# Patient Record
Sex: MALE | Race: ASIAN | NOT HISPANIC OR LATINO | URBAN - METROPOLITAN AREA
[De-identification: names, ages, dates, MRNs, and addresses within clinical notes are randomized per-mention and may not be internally consistent; named-entity substitution may affect disease eponyms.]

---

## 2022-07-22 ENCOUNTER — EMERGENCY (EMERGENCY)
Facility: HOSPITAL | Age: 77
LOS: 1 days | Discharge: HOME | End: 2022-07-22
Attending: EMERGENCY MEDICINE | Admitting: EMERGENCY MEDICINE

## 2022-07-22 VITALS
HEART RATE: 63 BPM | HEIGHT: 66 IN | RESPIRATION RATE: 18 BRPM | OXYGEN SATURATION: 99 % | SYSTOLIC BLOOD PRESSURE: 138 MMHG | DIASTOLIC BLOOD PRESSURE: 63 MMHG | TEMPERATURE: 98 F

## 2022-07-22 VITALS
RESPIRATION RATE: 14 BRPM | DIASTOLIC BLOOD PRESSURE: 76 MMHG | OXYGEN SATURATION: 100 % | SYSTOLIC BLOOD PRESSURE: 166 MMHG | TEMPERATURE: 98 F | HEART RATE: 51 BPM

## 2022-07-22 LAB
ALBUMIN SERPL ELPH-MCNC: 4.2 G/DL — SIGNIFICANT CHANGE UP (ref 3.3–5)
ALP SERPL-CCNC: 30 U/L — LOW (ref 40–120)
ALT FLD-CCNC: 15 U/L — SIGNIFICANT CHANGE UP (ref 4–41)
ANION GAP SERPL CALC-SCNC: 13 MMOL/L — SIGNIFICANT CHANGE UP (ref 7–14)
AST SERPL-CCNC: 32 U/L — SIGNIFICANT CHANGE UP (ref 4–40)
BASE EXCESS BLDV CALC-SCNC: -3.3 MMOL/L — LOW (ref -2–3)
BASOPHILS # BLD AUTO: 0.01 K/UL — SIGNIFICANT CHANGE UP (ref 0–0.2)
BASOPHILS NFR BLD AUTO: 0.2 % — SIGNIFICANT CHANGE UP (ref 0–2)
BILIRUB SERPL-MCNC: 0.5 MG/DL — SIGNIFICANT CHANGE UP (ref 0.2–1.2)
BLOOD GAS VENOUS COMPREHENSIVE RESULT: SIGNIFICANT CHANGE UP
BUN SERPL-MCNC: 20 MG/DL — SIGNIFICANT CHANGE UP (ref 7–23)
CALCIUM SERPL-MCNC: 8.6 MG/DL — SIGNIFICANT CHANGE UP (ref 8.4–10.5)
CHLORIDE BLDV-SCNC: 101 MMOL/L — SIGNIFICANT CHANGE UP (ref 96–108)
CHLORIDE SERPL-SCNC: 97 MMOL/L — LOW (ref 98–107)
CO2 BLDV-SCNC: 23.3 MMOL/L — SIGNIFICANT CHANGE UP (ref 22–26)
CO2 SERPL-SCNC: 20 MMOL/L — LOW (ref 22–31)
CREAT SERPL-MCNC: 1.34 MG/DL — HIGH (ref 0.5–1.3)
EGFR: 55 ML/MIN/1.73M2 — LOW
EOSINOPHIL # BLD AUTO: 0.01 K/UL — SIGNIFICANT CHANGE UP (ref 0–0.5)
EOSINOPHIL NFR BLD AUTO: 0.2 % — SIGNIFICANT CHANGE UP (ref 0–6)
FLUAV AG NPH QL: SIGNIFICANT CHANGE UP
FLUBV AG NPH QL: SIGNIFICANT CHANGE UP
GAS PNL BLDV: 130 MMOL/L — LOW (ref 136–145)
GAS PNL BLDV: SIGNIFICANT CHANGE UP
GLUCOSE BLDV-MCNC: 93 MG/DL — SIGNIFICANT CHANGE UP (ref 70–99)
GLUCOSE SERPL-MCNC: 105 MG/DL — HIGH (ref 70–99)
HCO3 BLDV-SCNC: 22 MMOL/L — SIGNIFICANT CHANGE UP (ref 22–29)
HCT VFR BLD CALC: 34.2 % — LOW (ref 39–50)
HCT VFR BLDA CALC: 35 % — LOW (ref 39–51)
HGB BLD CALC-MCNC: 11.5 G/DL — LOW (ref 13–17)
HGB BLD-MCNC: 11.6 G/DL — LOW (ref 13–17)
IANC: 3.96 K/UL — SIGNIFICANT CHANGE UP (ref 1.8–7.4)
IMM GRANULOCYTES NFR BLD AUTO: 0.2 % — SIGNIFICANT CHANGE UP (ref 0–1.5)
LACTATE BLDV-MCNC: 1 MMOL/L — SIGNIFICANT CHANGE UP (ref 0.5–2)
LYMPHOCYTES # BLD AUTO: 1.37 K/UL — SIGNIFICANT CHANGE UP (ref 1–3.3)
LYMPHOCYTES # BLD AUTO: 21.5 % — SIGNIFICANT CHANGE UP (ref 13–44)
MCHC RBC-ENTMCNC: 28.2 PG — SIGNIFICANT CHANGE UP (ref 27–34)
MCHC RBC-ENTMCNC: 33.9 GM/DL — SIGNIFICANT CHANGE UP (ref 32–36)
MCV RBC AUTO: 83.2 FL — SIGNIFICANT CHANGE UP (ref 80–100)
MONOCYTES # BLD AUTO: 1.02 K/UL — HIGH (ref 0–0.9)
MONOCYTES NFR BLD AUTO: 16 % — HIGH (ref 2–14)
NEUTROPHILS # BLD AUTO: 3.96 K/UL — SIGNIFICANT CHANGE UP (ref 1.8–7.4)
NEUTROPHILS NFR BLD AUTO: 61.9 % — SIGNIFICANT CHANGE UP (ref 43–77)
NRBC # BLD: 0 /100 WBCS — SIGNIFICANT CHANGE UP
NRBC # FLD: 0 K/UL — SIGNIFICANT CHANGE UP
NT-PROBNP SERPL-SCNC: 636 PG/ML — HIGH
PCO2 BLDV: 40 MMHG — LOW (ref 42–55)
PH BLDV: 7.35 — SIGNIFICANT CHANGE UP (ref 7.32–7.43)
PLATELET # BLD AUTO: 115 K/UL — LOW (ref 150–400)
PO2 BLDV: 39 MMHG — SIGNIFICANT CHANGE UP
POTASSIUM BLDV-SCNC: 4.1 MMOL/L — SIGNIFICANT CHANGE UP (ref 3.5–5.1)
POTASSIUM SERPL-MCNC: 4.3 MMOL/L — SIGNIFICANT CHANGE UP (ref 3.5–5.3)
POTASSIUM SERPL-SCNC: 4.3 MMOL/L — SIGNIFICANT CHANGE UP (ref 3.5–5.3)
PROT SERPL-MCNC: 7 G/DL — SIGNIFICANT CHANGE UP (ref 6–8.3)
RBC # BLD: 4.11 M/UL — LOW (ref 4.2–5.8)
RBC # FLD: 13.5 % — SIGNIFICANT CHANGE UP (ref 10.3–14.5)
RSV RNA NPH QL NAA+NON-PROBE: SIGNIFICANT CHANGE UP
SAO2 % BLDV: 61 % — SIGNIFICANT CHANGE UP
SARS-COV-2 RNA SPEC QL NAA+PROBE: DETECTED
SODIUM SERPL-SCNC: 130 MMOL/L — LOW (ref 135–145)
TROPONIN T, HIGH SENSITIVITY RESULT: 16 NG/L — SIGNIFICANT CHANGE UP
TROPONIN T, HIGH SENSITIVITY RESULT: 21 NG/L — SIGNIFICANT CHANGE UP
WBC # BLD: 6.38 K/UL — SIGNIFICANT CHANGE UP (ref 3.8–10.5)
WBC # FLD AUTO: 6.38 K/UL — SIGNIFICANT CHANGE UP (ref 3.8–10.5)

## 2022-07-22 PROCEDURE — 71046 X-RAY EXAM CHEST 2 VIEWS: CPT | Mod: 26

## 2022-07-22 PROCEDURE — 99285 EMERGENCY DEPT VISIT HI MDM: CPT | Mod: 25

## 2022-07-22 PROCEDURE — 93010 ELECTROCARDIOGRAM REPORT: CPT

## 2022-07-22 RX ORDER — IBUPROFEN 200 MG
400 TABLET ORAL ONCE
Refills: 0 | Status: COMPLETED | OUTPATIENT
Start: 2022-07-22 | End: 2022-07-22

## 2022-07-22 RX ORDER — SODIUM CHLORIDE 9 MG/ML
1000 INJECTION INTRAMUSCULAR; INTRAVENOUS; SUBCUTANEOUS ONCE
Refills: 0 | Status: COMPLETED | OUTPATIENT
Start: 2022-07-22 | End: 2022-07-22

## 2022-07-22 RX ADMIN — SODIUM CHLORIDE 1000 MILLILITER(S): 9 INJECTION INTRAMUSCULAR; INTRAVENOUS; SUBCUTANEOUS at 11:45

## 2022-07-22 RX ADMIN — Medication 400 MILLIGRAM(S): at 13:28

## 2022-07-22 NOTE — ED PROVIDER NOTE - CLINICAL SUMMARY MEDICAL DECISION MAKING FREE TEXT BOX
77y male pt PMHx HTN, HLD, CAD w stents, DM who presents to ED for sob, cough, sore throat, and chest pain w exertion. of note new onset dizziness. On exam found unremarkable. Will draw labs, swab, cxr, ekg and reassess

## 2022-07-22 NOTE — ED ADULT NURSE NOTE - OBJECTIVE STATEMENT
Pt arriving to room 23 A&OX4 ambulatory c/o lightheadedness x 1 week. Pt seen by PCP on Wednesday. Endorsing sore throat. Denies CP, SOB, abdominal pain, N/V/D, sick contacts. Respirations even and unlabored. Sinus naya on CM. 20g IV placed in RAC. Labs drawn and sent. Fluids running as ordered. Awaiting CT.

## 2022-07-22 NOTE — ED PROVIDER NOTE - OBJECTIVE STATEMENT
77 male pt PMHx CAD stents, HTN, HLD, DM who presents to ED for GOMEZ, cough, sore throat and chest pain w exertion since couple of days ago. Of note endorsing new onset dizziness/lightheadedness. Covid vaccinated. Denies fevers, chills, abd pain, n/v/d, urine symptoms 77 male pt PMHx CAD stents, HTN, HLD, DM who presents to ED for GOMEZ, cough, sore throat and chest pain w cough X 1 wk. Of note endorsing new onset dizziness/lightheadedness. Covid vaccinated. Denies fevers, chills, abd pain, n/v/d, urine symptoms

## 2022-07-22 NOTE — ED PROVIDER NOTE - PHYSICAL EXAMINATION
GENERAL: Awake, alert, NAD  HEENT: NC/AT, pharyngitis  LUNGS: CTAB, no wheezes or crackles   CARDIAC: RRR, no m/r/g  ABDOMEN: Soft,  non tender, non distended, no rebound, no guarding  EXT: No edema, no calf tenderness, 2+ PT pulses bilaterally, no deformities.  NEURO: A&Ox3. Moving all extremities.  SKIN: Warm and dry. No rash.

## 2022-07-22 NOTE — ED ADULT NURSE REASSESSMENT NOTE - NS ED NURSE REASSESS COMMENT FT1
Pt resting comfortably in stretcher. Pt Covid+. States grandchildren that he lives with tested positive last week. Respirations even and unlabored. Denies CP. Medicated as per EMAR. Pt to be discharged home.

## 2022-07-22 NOTE — ED PROVIDER NOTE - NSICDXPASTMEDICALHX_GEN_ALL_CORE_FT
PAST MEDICAL HISTORY:  CAD (coronary artery disease)     DM (diabetes mellitus)     HTN (hypertension)     Stented coronary artery

## 2022-07-22 NOTE — ED PROVIDER NOTE - NSFOLLOWUPINSTRUCTIONS_ED_ALL_ED_FT
You were found to have COVID in the ED. May take Tylenol and Ibuprofen for fevers or pain. Important to follow up with your cardiologist and primary care provider for further management on your exertional chest pain.     Please return to ED if you have any worsening shortness of breath, or new onset vomiting, lethargy, or any other concerns.     Important to quarantine for 5-7 days since symptom onset.     Continue taking your at home medications as prescribed.

## 2022-07-22 NOTE — ED PROVIDER NOTE - NS ED ROS FT
CONST: no fevers, no chills. +dizziness  EYES: no pain, no vision changes  ENT: +sore throat  CV: +chest pain  RESP: +SOB, cough  ABD: no abdominal pain, no nausea, no vomiting, no diarrhea  : no dysuria, no flank pain, no hematuria  MSK: no back pain, no extremity pain  NEURO: no headache or additional neurologic complaints  SKIN:  no rash

## 2022-07-22 NOTE — ED PROVIDER NOTE - ATTENDING CONTRIBUTION TO CARE
Pt was seen and evaluated by me. Pt is a 76 y/o male with PMHx of CAD s/p stents, HTN, HLD, DM type 2 who presented to the ED for cough, sore throat, and chest pain with cough X 1 wk. Pt states over the past wk having cough, sore throat, and chest pain X 1 wk. Pt also notes having some dizziness and lightheadedness, Pt denies any fever, chills, SOB, or chest pain without cough. Pt denies any difficulty swallowing.   VITALS: Vitals have been reviewed.  GEN APPEARANCE: WDWN, alert and cooperative, non-toxic appearing and in NAD  HEAD: Atraumatic, normocephalic.   EYES: PERRL, EOMI.   EARS: Gross hearing intact.   NOSE: No nasal discharge.   THROAT: MMM. Oral cavity and pharynx normal. Uvula midline. No swelling. No exudate.    NECK: Supple, no lymphadenopathy  CV: RRR, S1S2, no c/r/m/g. No cyanosis or pallor. Extremities warm, well perfused. Cap refill <2 seconds. No bruits.   LUNGS: CTAB. No wheezing. No rales. No rhonchi. No diminished breath sounds.   ABDOMEN: Soft, NTND. No guarding or rebound.   MSK/EXT: Spine appears normal, no spine point tenderness. No CVA ttp. Normal muscular development. PELVIS: Stable. No obvious joint or bony deformity, no peripheral edema.   NEURO: Alert, follows commands. Speech normal. Sensation and motor normal x4 extremities.   SKIN: Normal color for race, warm, dry and intact. No evidence of rash.  PSYCH: Normal mood and affect.   76 y/o male with PMHx of CAD s/p stents, HTN, HLD, DM type 2 who presented to the ED for cough, sore throat, and chest pain with cough X 1 wk.   Concern for COVID/URI/ACS  Labs, EKG, CXR, IVF, Analgesia

## 2022-07-22 NOTE — ED PROVIDER NOTE - PATIENT PORTAL LINK FT
You can access the FollowMyHealth Patient Portal offered by [facility] by registering at the following website: [email or web address]. By joining [facility]’s FollowMyHealth portal, you will also be able to view your health information using other applications (apps) compatible with our system.

## 2023-09-06 ENCOUNTER — INPATIENT (INPATIENT)
Facility: HOSPITAL | Age: 78
LOS: 1 days | Discharge: HOME | End: 2023-09-08
Attending: INTERNAL MEDICINE | Admitting: INTERNAL MEDICINE
Payer: MEDICARE

## 2023-09-06 VITALS
DIASTOLIC BLOOD PRESSURE: 83 MMHG | OXYGEN SATURATION: 100 % | TEMPERATURE: 98 F | HEART RATE: 64 BPM | RESPIRATION RATE: 18 BRPM | SYSTOLIC BLOOD PRESSURE: 172 MMHG

## 2023-09-06 DIAGNOSIS — D64.9 ANEMIA, UNSPECIFIED: ICD-10-CM

## 2023-09-06 DIAGNOSIS — Z29.9 ENCOUNTER FOR PROPHYLACTIC MEASURES, UNSPECIFIED: ICD-10-CM

## 2023-09-06 DIAGNOSIS — R07.9 CHEST PAIN, UNSPECIFIED: ICD-10-CM

## 2023-09-06 DIAGNOSIS — R00.1 BRADYCARDIA, UNSPECIFIED: ICD-10-CM

## 2023-09-06 DIAGNOSIS — I10 ESSENTIAL (PRIMARY) HYPERTENSION: ICD-10-CM

## 2023-09-06 DIAGNOSIS — I25.10 ATHEROSCLEROTIC HEART DISEASE OF NATIVE CORONARY ARTERY WITHOUT ANGINA PECTORIS: ICD-10-CM

## 2023-09-06 DIAGNOSIS — K92.1 MELENA: ICD-10-CM

## 2023-09-06 LAB
ALBUMIN SERPL ELPH-MCNC: 4.3 G/DL — SIGNIFICANT CHANGE UP (ref 3.3–5)
ALP SERPL-CCNC: 38 U/L — LOW (ref 40–120)
ALT FLD-CCNC: 18 U/L — SIGNIFICANT CHANGE UP (ref 4–41)
ANION GAP SERPL CALC-SCNC: 13 MMOL/L — SIGNIFICANT CHANGE UP (ref 7–14)
AST SERPL-CCNC: 26 U/L — SIGNIFICANT CHANGE UP (ref 4–40)
BASOPHILS # BLD AUTO: 0.02 K/UL — SIGNIFICANT CHANGE UP (ref 0–0.2)
BASOPHILS NFR BLD AUTO: 0.3 % — SIGNIFICANT CHANGE UP (ref 0–2)
BILIRUB SERPL-MCNC: 0.4 MG/DL — SIGNIFICANT CHANGE UP (ref 0.2–1.2)
BUN SERPL-MCNC: 21 MG/DL — SIGNIFICANT CHANGE UP (ref 7–23)
CALCIUM SERPL-MCNC: 9.4 MG/DL — SIGNIFICANT CHANGE UP (ref 8.4–10.5)
CHLORIDE SERPL-SCNC: 101 MMOL/L — SIGNIFICANT CHANGE UP (ref 98–107)
CO2 SERPL-SCNC: 23 MMOL/L — SIGNIFICANT CHANGE UP (ref 22–31)
CREAT SERPL-MCNC: 1.23 MG/DL — SIGNIFICANT CHANGE UP (ref 0.5–1.3)
EGFR: 60 ML/MIN/1.73M2 — SIGNIFICANT CHANGE UP
EOSINOPHIL # BLD AUTO: 0.13 K/UL — SIGNIFICANT CHANGE UP (ref 0–0.5)
EOSINOPHIL NFR BLD AUTO: 1.8 % — SIGNIFICANT CHANGE UP (ref 0–6)
FLUAV AG NPH QL: SIGNIFICANT CHANGE UP
FLUBV AG NPH QL: SIGNIFICANT CHANGE UP
GLUCOSE BLDC GLUCOMTR-MCNC: 215 MG/DL — HIGH (ref 70–99)
GLUCOSE BLDC GLUCOMTR-MCNC: 96 MG/DL — SIGNIFICANT CHANGE UP (ref 70–99)
GLUCOSE SERPL-MCNC: 95 MG/DL — SIGNIFICANT CHANGE UP (ref 70–99)
HCT VFR BLD CALC: 34.8 % — LOW (ref 39–50)
HGB BLD-MCNC: 11.8 G/DL — LOW (ref 13–17)
IANC: 3.85 K/UL — SIGNIFICANT CHANGE UP (ref 1.8–7.4)
IMM GRANULOCYTES NFR BLD AUTO: 0.1 % — SIGNIFICANT CHANGE UP (ref 0–0.9)
LYMPHOCYTES # BLD AUTO: 2.68 K/UL — SIGNIFICANT CHANGE UP (ref 1–3.3)
LYMPHOCYTES # BLD AUTO: 36.3 % — SIGNIFICANT CHANGE UP (ref 13–44)
MCHC RBC-ENTMCNC: 27.4 PG — SIGNIFICANT CHANGE UP (ref 27–34)
MCHC RBC-ENTMCNC: 33.9 GM/DL — SIGNIFICANT CHANGE UP (ref 32–36)
MCV RBC AUTO: 80.9 FL — SIGNIFICANT CHANGE UP (ref 80–100)
MONOCYTES # BLD AUTO: 0.7 K/UL — SIGNIFICANT CHANGE UP (ref 0–0.9)
MONOCYTES NFR BLD AUTO: 9.5 % — SIGNIFICANT CHANGE UP (ref 2–14)
NEUTROPHILS # BLD AUTO: 3.85 K/UL — SIGNIFICANT CHANGE UP (ref 1.8–7.4)
NEUTROPHILS NFR BLD AUTO: 52 % — SIGNIFICANT CHANGE UP (ref 43–77)
NRBC # BLD: 0 /100 WBCS — SIGNIFICANT CHANGE UP (ref 0–0)
NRBC # FLD: 0 K/UL — SIGNIFICANT CHANGE UP (ref 0–0)
NT-PROBNP SERPL-SCNC: 265 PG/ML — SIGNIFICANT CHANGE UP
PLATELET # BLD AUTO: 181 K/UL — SIGNIFICANT CHANGE UP (ref 150–400)
POTASSIUM SERPL-MCNC: 4.4 MMOL/L — SIGNIFICANT CHANGE UP (ref 3.5–5.3)
POTASSIUM SERPL-SCNC: 4.4 MMOL/L — SIGNIFICANT CHANGE UP (ref 3.5–5.3)
PROT SERPL-MCNC: 7.3 G/DL — SIGNIFICANT CHANGE UP (ref 6–8.3)
RBC # BLD: 4.3 M/UL — SIGNIFICANT CHANGE UP (ref 4.2–5.8)
RBC # FLD: 14.6 % — HIGH (ref 10.3–14.5)
RSV RNA NPH QL NAA+NON-PROBE: SIGNIFICANT CHANGE UP
SARS-COV-2 RNA SPEC QL NAA+PROBE: SIGNIFICANT CHANGE UP
SODIUM SERPL-SCNC: 137 MMOL/L — SIGNIFICANT CHANGE UP (ref 135–145)
TROPONIN T, HIGH SENSITIVITY RESULT: 15 NG/L — SIGNIFICANT CHANGE UP
TROPONIN T, HIGH SENSITIVITY RESULT: 19 NG/L — SIGNIFICANT CHANGE UP
WBC # BLD: 7.39 K/UL — SIGNIFICANT CHANGE UP (ref 3.8–10.5)
WBC # FLD AUTO: 7.39 K/UL — SIGNIFICANT CHANGE UP (ref 3.8–10.5)

## 2023-09-06 PROCEDURE — 99285 EMERGENCY DEPT VISIT HI MDM: CPT | Mod: 25

## 2023-09-06 PROCEDURE — 99223 1ST HOSP IP/OBS HIGH 75: CPT

## 2023-09-06 PROCEDURE — 71046 X-RAY EXAM CHEST 2 VIEWS: CPT | Mod: 26

## 2023-09-06 RX ORDER — DEXTROSE 50 % IN WATER 50 %
15 SYRINGE (ML) INTRAVENOUS ONCE
Refills: 0 | Status: DISCONTINUED | OUTPATIENT
Start: 2023-09-06 | End: 2023-09-06

## 2023-09-06 RX ORDER — LOSARTAN POTASSIUM 100 MG/1
1 TABLET, FILM COATED ORAL
Refills: 0 | DISCHARGE

## 2023-09-06 RX ORDER — DEXTROSE 50 % IN WATER 50 %
25 SYRINGE (ML) INTRAVENOUS ONCE
Refills: 0 | Status: DISCONTINUED | OUTPATIENT
Start: 2023-09-06 | End: 2023-09-06

## 2023-09-06 RX ORDER — ACETAMINOPHEN 500 MG
650 TABLET ORAL EVERY 6 HOURS
Refills: 0 | Status: DISCONTINUED | OUTPATIENT
Start: 2023-09-06 | End: 2023-09-08

## 2023-09-06 RX ORDER — FUROSEMIDE 40 MG
1 TABLET ORAL
Refills: 0 | DISCHARGE

## 2023-09-06 RX ORDER — INSULIN LISPRO 100/ML
VIAL (ML) SUBCUTANEOUS
Refills: 0 | Status: DISCONTINUED | OUTPATIENT
Start: 2023-09-06 | End: 2023-09-06

## 2023-09-06 RX ORDER — METOPROLOL TARTRATE 50 MG
25 TABLET ORAL DAILY
Refills: 0 | Status: DISCONTINUED | OUTPATIENT
Start: 2023-09-06 | End: 2023-09-08

## 2023-09-06 RX ORDER — GLUCAGON INJECTION, SOLUTION 0.5 MG/.1ML
1 INJECTION, SOLUTION SUBCUTANEOUS ONCE
Refills: 0 | Status: DISCONTINUED | OUTPATIENT
Start: 2023-09-06 | End: 2023-09-06

## 2023-09-06 RX ORDER — SODIUM CHLORIDE 9 MG/ML
1000 INJECTION, SOLUTION INTRAVENOUS
Refills: 0 | Status: DISCONTINUED | OUTPATIENT
Start: 2023-09-06 | End: 2023-09-06

## 2023-09-06 RX ORDER — ROSUVASTATIN CALCIUM 5 MG/1
1 TABLET ORAL
Refills: 0 | DISCHARGE

## 2023-09-06 RX ORDER — LEVOTHYROXINE SODIUM 125 MCG
1 TABLET ORAL
Refills: 0 | DISCHARGE

## 2023-09-06 RX ORDER — INSULIN LISPRO 100/ML
VIAL (ML) SUBCUTANEOUS AT BEDTIME
Refills: 0 | Status: DISCONTINUED | OUTPATIENT
Start: 2023-09-06 | End: 2023-09-06

## 2023-09-06 RX ORDER — LOSARTAN POTASSIUM 100 MG/1
50 TABLET, FILM COATED ORAL DAILY
Refills: 0 | Status: DISCONTINUED | OUTPATIENT
Start: 2023-09-06 | End: 2023-09-08

## 2023-09-06 RX ORDER — ASPIRIN/CALCIUM CARB/MAGNESIUM 324 MG
324 TABLET ORAL ONCE
Refills: 0 | Status: COMPLETED | OUTPATIENT
Start: 2023-09-06 | End: 2023-09-06

## 2023-09-06 RX ORDER — ATORVASTATIN CALCIUM 80 MG/1
80 TABLET, FILM COATED ORAL AT BEDTIME
Refills: 0 | Status: DISCONTINUED | OUTPATIENT
Start: 2023-09-06 | End: 2023-09-08

## 2023-09-06 RX ORDER — METOPROLOL TARTRATE 50 MG
1 TABLET ORAL
Refills: 0 | DISCHARGE

## 2023-09-06 RX ORDER — LANOLIN ALCOHOL/MO/W.PET/CERES
3 CREAM (GRAM) TOPICAL AT BEDTIME
Refills: 0 | Status: DISCONTINUED | OUTPATIENT
Start: 2023-09-06 | End: 2023-09-08

## 2023-09-06 RX ORDER — DEXTROSE 50 % IN WATER 50 %
12.5 SYRINGE (ML) INTRAVENOUS ONCE
Refills: 0 | Status: DISCONTINUED | OUTPATIENT
Start: 2023-09-06 | End: 2023-09-06

## 2023-09-06 RX ORDER — ONDANSETRON 8 MG/1
4 TABLET, FILM COATED ORAL EVERY 8 HOURS
Refills: 0 | Status: DISCONTINUED | OUTPATIENT
Start: 2023-09-06 | End: 2023-09-08

## 2023-09-06 RX ORDER — LEVOTHYROXINE SODIUM 125 MCG
88 TABLET ORAL DAILY
Refills: 0 | Status: DISCONTINUED | OUTPATIENT
Start: 2023-09-06 | End: 2023-09-08

## 2023-09-06 RX ADMIN — Medication 324 MILLIGRAM(S): at 09:20

## 2023-09-06 RX ADMIN — ATORVASTATIN CALCIUM 80 MILLIGRAM(S): 80 TABLET, FILM COATED ORAL at 22:27

## 2023-09-06 NOTE — CONSULT NOTE ADULT - SUBJECTIVE AND OBJECTIVE BOX
date of consult 9/6/23    HISTORY OF PRESENT ILLNESS: HPI:    78-year-old male with past medical history of coronary artery disease s/p PCI, and hypertension presenting with chest pain associated with shortness of breath that has been present for over a year however worsened over the past 2 days. Reports pain only with exertion the last 2 days.  No recent work up.  Has followed intermittently with [facility] clinic.  LAst cath here 2015 with RCA stent per report.      No LE edema, no orthopnea, no palps.  Reports dizziness when walking 2 days ago, no LOC.  Hx hypothyroid, no longer on medications.  DEnies DM or HTN.    PAST MEDICAL & SURGICAL HISTORY:  CAD (coronary artery disease)      HTN (hypertension)      Stented coronary artery      No significant past surgical history      MEDICATIONS  (STANDING):  dextrose 5%. 1000 milliLiter(s) (100 mL/Hr) IV Continuous <Continuous>  dextrose 5%. 1000 milliLiter(s) (50 mL/Hr) IV Continuous <Continuous>  dextrose 50% Injectable 25 Gram(s) IV Push once  dextrose 50% Injectable 12.5 Gram(s) IV Push once  dextrose 50% Injectable 25 Gram(s) IV Push once  glucagon  Injectable 1 milliGRAM(s) IntraMuscular once  insulin lispro (ADMELOG) corrective regimen sliding scale   SubCutaneous three times a day before meals  insulin lispro (ADMELOG) corrective regimen sliding scale   SubCutaneous at bedtime      Allergies  PC Pen VK (Hives)      FAMILY HISTORY:  No pertinent family history in first degree relatives  Noncontributory for premature coronary disease or sudden cardiac death    SOCIAL HISTORY:    [x ] Non-smoker  [ ] Smoker  [ ] Alcohol    FLU VACCINE THIS YEAR STARTS IN AUGUST:  [ ] Yes    [ ] No    IF OVER 65 HAVE YOU EVER HAD A PNA VACCINE:  [ ] Yes    [ ] No       [ ] N/A      REVIEW OF SYSTEMS:  [x ]chest pain  [  ]shortness of breath  [  ]palpitations  [  ]syncope  [ ]near syncope [ ]upper extremity weakness   [ ] lower extremity weakness  [  ]diplopia  [  ]altered mental status   [  ]fevers  [ ]chills [ ]nausea  [ ]vomiting  [  ]dysphagia    [ ]abdominal pain  [ ]melena  [ ]BRBPR    [  ]epistaxis  [  ]rash    [ ]lower extremity edema        [x ] All others negative	  [ ] Unable to obtain      LABS:	 	    CARDIAC MARKERS:  TROP T 19, 15                            11.8   7.39  )-----------( 181      ( 06 Sep 2023 08:34 )             34.8     137  |  101  |  21  ----------------------------<  95  4.4   |  23  |  1.23    Ca    9.4      06 Sep 2023 08:34    TPro  7.3  /  Alb  4.3  /  TBili  0.4  /  DBili  x   /  AST  26  /  ALT  18  /  AlkPhos  38<L>  09-06    Creatinine Trend: 1.23<--    PHYSICAL EXAM:  T(C): 36.6 (09-06-23 @ 16:07), Max: 36.8 (09-06-23 @ 07:52)  HR: 51 (09-06-23 @ 16:07) (47 - 64)  BP: 175/78 (09-06-23 @ 16:07) (151/74 - 175/78)  RR: 15 (09-06-23 @ 16:07) (15 - 18)  SpO2: 99% (09-06-23 @ 16:07) (99% - 100%)    Gen: Appears well in NAD  HEENT:  (-)icterus (-)pallor  CV: N S1 S2 1/6 CHRISTIE (+)2 Pulses B/l  Resp:  Clear to ausculatation B/L, normal effort  GI: (+) BS Soft, NT, ND  Lymph:  (-)Edema, (-)obvious lymphadenopathy  Skin: Warm to touch, Normal turgor  Psych: Appropriate mood and affect	      ECG:  NSR	    < from: Xray Chest 2 Views PA/Lat (09.06.23 @ 09:02) >  IMPRESSION: Clear lungs with normal heart size and no evidence of CHF.    < end of copied text >      ASSESSMENT/PLAN: 	  78-year-old male with past medical history of coronary artery disease s/p PCI, and hypertension presenting with chest pain associated with shortness of breath that has been present for over a year however worsened over the past 2 days. Reports pain only with exertion the last 2 days.  No recent work up.  Has followed intermittently with [facility] clinic.  LAst cath here 2015 with RCA stent per report.    CHEST PAIN  --admit to tele  --ACS ruled out  --not in clinical CHF  --check TTE and NST  --check HgA1C and TSH    further reccs pending above                 date of consult 9/6/23    HISTORY OF PRESENT ILLNESS: HPI:    78-year-old male with past medical history of coronary artery disease s/p PCI, and hypertension presenting with chest pain associated with shortness of breath that has been present for over a year however worsened over the past 2 days. Reports pain only with exertion the last 2 days.  No recent work up.  Has followed intermittently with [facility] clinic.  LAst cath here 2015 with RCA stent per report.      No LE edema, no orthopnea, no palps.  Reports dizziness when walking 2 days ago, no LOC.   DEnies DM or HTN.    PAST MEDICAL & SURGICAL HISTORY:  CAD (coronary artery disease)      HTN (hypertension)      Stented coronary artery      No significant past surgical history      MEDICATIONS  (STANDING):  dextrose 5%. 1000 milliLiter(s) (100 mL/Hr) IV Continuous <Continuous>  dextrose 5%. 1000 milliLiter(s) (50 mL/Hr) IV Continuous <Continuous>  dextrose 50% Injectable 25 Gram(s) IV Push once  dextrose 50% Injectable 12.5 Gram(s) IV Push once  dextrose 50% Injectable 25 Gram(s) IV Push once  glucagon  Injectable 1 milliGRAM(s) IntraMuscular once  insulin lispro (ADMELOG) corrective regimen sliding scale   SubCutaneous three times a day before meals  insulin lispro (ADMELOG) corrective regimen sliding scale   SubCutaneous at bedtime      Allergies  PC Pen VK (Hives)      FAMILY HISTORY:  No pertinent family history in first degree relatives  Noncontributory for premature coronary disease or sudden cardiac death    SOCIAL HISTORY:    [x ] Non-smoker  [ ] Smoker  [ ] Alcohol    FLU VACCINE THIS YEAR STARTS IN AUGUST:  [ ] Yes    [ ] No    IF OVER 65 HAVE YOU EVER HAD A PNA VACCINE:  [ ] Yes    [ ] No       [ ] N/A      REVIEW OF SYSTEMS:  [x ]chest pain  [  ]shortness of breath  [  ]palpitations  [  ]syncope  [ ]near syncope [ ]upper extremity weakness   [ ] lower extremity weakness  [  ]diplopia  [  ]altered mental status   [  ]fevers  [ ]chills [ ]nausea  [ ]vomiting  [  ]dysphagia    [ ]abdominal pain  [ ]melena  [ ]BRBPR    [  ]epistaxis  [  ]rash    [ ]lower extremity edema        [x ] All others negative	  [ ] Unable to obtain      LABS:	 	    CARDIAC MARKERS:  TROP T 19, 15                            11.8   7.39  )-----------( 181      ( 06 Sep 2023 08:34 )             34.8     137  |  101  |  21  ----------------------------<  95  4.4   |  23  |  1.23    Ca    9.4      06 Sep 2023 08:34    TPro  7.3  /  Alb  4.3  /  TBili  0.4  /  DBili  x   /  AST  26  /  ALT  18  /  AlkPhos  38<L>  09-06    Creatinine Trend: 1.23<--    PHYSICAL EXAM:  T(C): 36.6 (09-06-23 @ 16:07), Max: 36.8 (09-06-23 @ 07:52)  HR: 51 (09-06-23 @ 16:07) (47 - 64)  BP: 175/78 (09-06-23 @ 16:07) (151/74 - 175/78)  RR: 15 (09-06-23 @ 16:07) (15 - 18)  SpO2: 99% (09-06-23 @ 16:07) (99% - 100%)    Gen: Appears well in NAD  HEENT:  (-)icterus (-)pallor  CV: N S1 S2 1/6 CHRISTIE (+)2 Pulses B/l  Resp:  Clear to ausculatation B/L, normal effort  GI: (+) BS Soft, NT, ND  Lymph:  (-)Edema, (-)obvious lymphadenopathy  Skin: Warm to touch, Normal turgor  Psych: Appropriate mood and affect	      ECG:  NSR	    < from: Xray Chest 2 Views PA/Lat (09.06.23 @ 09:02) >  IMPRESSION: Clear lungs with normal heart size and no evidence of CHF.    < end of copied text >      ASSESSMENT/PLAN: 	  78-year-old male with past medical history of coronary artery disease s/p PCI, and hypertension presenting with chest pain associated with shortness of breath that has been present for over a year however worsened over the past 2 days. Reports pain only with exertion the last 2 days.  No recent work up.  Has followed intermittently with [facility] clinic.  LAst cath here 2015 with RCA stent per report.    CHEST PAIN  --admit to tele  --ACS ruled out  --not in clinical CHF  --check TTE and NST  --check HgA1C and TSH    further reccs pending above

## 2023-09-06 NOTE — PHARMACOTHERAPY INTERVENTION NOTE - COMMENTS
Medication list updated in Outpatient Medication Record (OMR). Source: [facility]

## 2023-09-06 NOTE — ED PROVIDER NOTE - OBJECTIVE STATEMENT
78-year-old male past medical history of coronary artery disease status post stenting, diabetes, hypertension presenting with right-sided atraumatic nonradiating chest pain associated with shortness of breath that has been present for over a year however worsened over the past 2 days.      Patient also endorses feeling lightheadedness for the past 2 days but has had no syncopal episodes.  Chest pain and shortness of breath is worse with exertion.  Wife states 2 days ago patient was sitting on a chair when he slid down, did not hit his head or passed out however wife is concerned–patient states that chair was slippery and just slid down.  Last stress and echo testing was over 2 years ago. Dr. Monika Marshall ([place]) cardiology; has a new cardiologist but unsure of name    Denies syncope, presyncope, LOC, head injury or trauma, leg swelling, change in vision, palpitations, recent travel or prolonged immobilization, nausea, vomiting, diarrhea, abdominal pain, diaphoresis, neck/jaw/arm pain, back pain, cough, congestion, fevers, chills, sore throat, bowel or urine incontinence, hematuria, dysuria.

## 2023-09-06 NOTE — H&P ADULT - PROBLEM SELECTOR PLAN 1
Patient with chest pain that is exertional and associated with SOB   -Cardiology consulted.   -EKG showed bradycardia but no other acute changes. Trops are stable   -F/u with TTE and NST   -C/w telemonitoring

## 2023-09-06 NOTE — H&P ADULT - PROBLEM SELECTOR PLAN 3
Patient with chest pain with exertion   -EKG shows no acute changes. Trops are stable   -Cardiology recs appreciated   -F/u with TTE and NST

## 2023-09-06 NOTE — ED ADULT TRIAGE NOTE - CHIEF COMPLAINT QUOTE
Pt c/o "chest pain and sob worse on exertion x1 year". reports hasn't followed up with any doctors. Denies any leg swelling, n/v, fever. Hx DM, HTN, CAD x5 stents. Well appearing.

## 2023-09-06 NOTE — ED ADULT NURSE REASSESSMENT NOTE - NS ED NURSE REASSESS COMMENT FT1
Break Coverage RN: Pt A&Ox4, respirations equal and unlabored. Pt resting in stretcher at this time, offers no complaints. Pt admitted to telemetry, sinus naya on cardiac monitor, pending inpatient bed assignment. No acute distress noted. Safety maintained, bed in lowest position, side rails raised, call bell in reach.

## 2023-09-06 NOTE — PATIENT PROFILE ADULT - FALL HARM RISK - HARM RISK INTERVENTIONS

## 2023-09-06 NOTE — H&P ADULT - PROBLEM SELECTOR PLAN 2
Patient reports to have diarrhea and hematochezia   -Deferred rectal exam, f/u occult blood feces   -DDx includes hemorrhoids, infectious colitis, AVM, diverticular bleed, malignancy   -F/u with GI PCR, stool culture   -Trend CBC   -Consider GI consult, if hgb is downtrending  -Hold ASA and plavix for now

## 2023-09-06 NOTE — ED ADULT NURSE REASSESSMENT NOTE - NS ED NURSE REASSESS COMMENT FT1
PTs daughter stated PT had a "few droplets of blood in the toilet after the PT had a BM. ACP made aware. will continue to observe.

## 2023-09-06 NOTE — ED ADULT NURSE NOTE - OBJECTIVE STATEMENT
A&Ox3. ambulatory. c/o non radiating right side chest pain on exertion and intermittent dizziness for two days. wife at bedside states PT "slid to floor off of the chair while eating a meal" two days ago. NAD. pt denies SOB, chest pain, dizziness, weakness, urinary symptoms, HA, n/v/d, fevers, chills. respirations are even and un labored. ABD is soft and non tender. skin intact. call bell at bedside. 20g placed to LAC. labs drawn and sent.

## 2023-09-06 NOTE — ED PROVIDER NOTE - CLINICAL SUMMARY MEDICAL DECISION MAKING FREE TEXT BOX
78-year-old male past medical history of coronary artery disease status post stenting, diabetes, hypertension presenting with right-sided atraumatic nonradiating chest pain associated with shortness of breath that has been present for over a year however worsened over the past 2 days.      No evidence of volume overload or shock on exam. EKG without signs of active ischemia. EKG without evidence of STEMI. Low suspicion for acute PE (Wells low risk), pneumothorax, thoracic aortic dissection, cardiac effusion / tamponade. Overall, ACS is being considered given higher risk features, history & physical.    Patient will likely require admission for inpatient risk stratification and possible provocative testing.  Plan: cardiac monitor, EKG, troponin, CXR, ASA,  pain control, reassess,

## 2023-09-06 NOTE — H&P ADULT - NSHPPHYSICALEXAM_GEN_ALL_CORE
Vital Signs Last 24 Hrs  T(C): 36.3 (06 Sep 2023 20:36), Max: 36.8 (06 Sep 2023 07:52)  T(F): 97.4 (06 Sep 2023 20:36), Max: 98.2 (06 Sep 2023 07:52)  HR: 67 (06 Sep 2023 20:36) (47 - 67)  BP: 145/94 (06 Sep 2023 20:36) (145/94 - 175/78)  BP(mean): --  RR: 16 (06 Sep 2023 20:36) (15 - 18)  SpO2: 100% (06 Sep 2023 20:36) (99% - 100%)    Parameters below as of 06 Sep 2023 20:36  Patient On (Oxygen Delivery Method): room air    GENERAL: NAD, well-developed  HEENT:  Atraumatic, Normocephalic, Conjunctiva and sclera clear, oral mucosa moist, clear w/o any exudate   NECK: Supple, No JVD  CHEST/LUNG: Clear to auscultation bilaterally; No wheeze  HEART: Regular rate and rhythm; No murmurs, rubs, or gallops  ABDOMEN: Soft, Nontender, Nondistended; Bowel sounds present  RECTAL: deferred as pt is in the hallway   EXTREMITIES:  2+ Peripheral Pulses, No clubbing, cyanosis, or edema  PSYCH: AAOx3, normal affect  NEUROLOGY: non-focal, moving all extremities.   SKIN: No rashes or lesions

## 2023-09-06 NOTE — H&P ADULT - NSHPREVIEWOFSYSTEMS_GEN_ALL_CORE
REVIEW OF SYSTEMS:    CONSTITUTIONAL: No weakness, fevers or chills  EYES/ENT: No visual changes;  No vertigo or throat pain   NECK: No pain or stiffness  RESPIRATORY: No cough, wheezing, hemoptysis; No shortness of breath  CARDIOVASCULAR: +chest pain   GASTROINTESTINAL: No abdominal or epigastric pain. No nausea, vomiting, or hematemesis; No diarrhea or constipation. +hematochezia.  GENITOURINARY: No dysuria, frequency or hematuria  NEUROLOGICAL: No numbness or weakness  MUSCULOSKELETAL: No joint pain, no muscle ache   SKIN: No itching, burning, rashes, or lesions   All other review of systems is negative unless indicated above.

## 2023-09-06 NOTE — ED PROVIDER NOTE - ATTENDING APP SHARED VISIT CONTRIBUTION OF CARE
I have personally performed a history and physical examination of the patient and discussed management with the VIKASH as well as the patient.  I reviewed the VIKASH's note and agree with the documented findings and plan of care.  I have authored and modified critical sections of the Provider Note, including but not limited to HPI, Physical Exam and MDM.    78-year-old male past medical history of coronary artery disease status post stenting, diabetes, hypertension presenting with right-sided atraumatic nonradiating chest pain associated with shortness of breath that has been present for over a year however worsened over the past 2 days.  Endorses lightheadedness for the past 2 days but has had no syncopal episodes.  Chest pain and shortness of breath is worse with exertion.  Given strong history of ACS consider ischemia vs demand vs electrolyte abnormality vs low suspicion pna. Independent review of EKG show NSR without STEMI or TWI.  Hemodynamically stable on examination. Obtain cbc, cmp, CXR, give ASA, symptomatic control prn. Admit for further cardiac evaluation. I have personally performed a history and physical examination of the patient and discussed management with the VIKASH as well as the patient.  I reviewed the VIKASH's note and agree with the documented findings and plan of care.  I have authored and modified critical sections of the Provider Note, including but not limited to HPI, Physical Exam and MDM.    78-year-old male past medical history of coronary artery disease status post stenting, diabetes, hypertension presenting with right-sided atraumatic nonradiating chest pain associated with shortness of breath that has been present for over a year however worsened over the past 2 days.  Endorses lightheadedness for the past 2 days but has had no syncopal episodes.  Chest pain and shortness of breath is worse with exertion.  Given strong history of ACS consider ischemia vs demand vs electrolyte abnormality vs low suspicion pna. Independent review of EKG show NSR without STEMI or TWI. As per independent chart review EKG similar to prior on 7/22/23. Hemodynamically stable on examination. Obtain cbc, cmp, CXR, give ASA, symptomatic control prn. Admit for further cardiac evaluation. I have personally performed a history and physical examination of the patient and discussed management with the VIKASH as well as the patient.  I reviewed the VIKASH's note and agree with the documented findings and plan of care.  I have authored and modified critical sections of the Provider Note, including but not limited to HPI, Physical Exam and MDM.    78-year-old male past medical history of coronary artery disease status post stenting, diabetes, hypertension presenting with right-sided atraumatic nonradiating chest pain associated with shortness of breath that has been present for over a year however worsened over the past 2 days.  Endorses lightheadedness for the past 2 days but has had no syncopal episodes.  Chest pain and shortness of breath is worse with exertion.  Given strong history of ACS consider ischemia vs demand vs electrolyte abnormality vs low suspicion pna. Independent review of EKG shows no STEMI with isolated TWI in V2 to different from prior in 7/22/2022. Hemodynamically stable on examination. Obtain cbc, cmp, CXR, give ASA, symptomatic control prn. Admit for further cardiac evaluation.

## 2023-09-06 NOTE — CONSULT NOTE ADULT - NS ATTEND AMEND GEN_ALL_CORE FT
chronic daily chest pain for > 1 yr, now with similar-but-different sensation , feels like something is wrong or out of place.  awaiting and stress testing for risk stratification, given hx of CAD and lack of recent followup.
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
## 2023-09-06 NOTE — H&P ADULT - PROBLEM SELECTOR PLAN 6
Hgb downtrended to 11.8   -Pt reports to have hematochezia   -F/u with repeat CBC   -Consider GI consult if hgb is downtrending

## 2023-09-06 NOTE — ED ADULT TRIAGE NOTE - RESPIRATORY RATE (BREATHS/MIN)
18 Azithromycin Counseling:  I discussed with the patient the risks of azithromycin including but not limited to GI upset, allergic reaction, drug rash, diarrhea, and yeast infections.

## 2023-09-06 NOTE — H&P ADULT - HISTORY OF PRESENT ILLNESS
79 yo F with CAD s/p stent, HTN, and HLD presents to the ED with chest pain and hematochezia. Patient reports that he has been having R sided chest pain for the last couple of months. It is intermittent, nonradiating, and nonreproducible on palpation. It is associated with exertion. He reports to [facility] as well. Nothing relieves the pain. He also started having diarrhea today with hematochezia. The blood is not mixed with stool. He has abdominal discomfort with BM. He reports to have fatigue but denies any fever, chills, nausea, vomiting, or LE edema.   In the ED, her vitals were notable for bradycardia and HTN. Labs were notable for anemia. EKG showed sinus bradycardia.

## 2023-09-06 NOTE — H&P ADULT - NSHPLABSRESULTS_GEN_ALL_CORE
11.8   7.39  )-----------( 181      ( 06 Sep 2023 08:34 )             34.8     Hgb Trend: 11.8<--  09-06    137  |  101  |  21  ----------------------------<  95  4.4   |  23  |  1.23    Ca    9.4      06 Sep 2023 08:34    TPro  7.3  /  Alb  4.3  /  TBili  0.4  /  DBili  x   /  AST  26  /  ALT  18  /  AlkPhos  38<L>  09-06    Creatinine Trend: 1.23<--        Urinalysis Basic - ( 06 Sep 2023 08:34 )    Color: x / Appearance: x / SG: x / pH: x  Gluc: 95 mg/dL / Ketone: x  / Bili: x / Urobili: x   Blood: x / Protein: x / Nitrite: x   Leuk Esterase: x / RBC: x / WBC x   Sq Epi: x / Non Sq Epi: x / Bacteria: x        EKG as reviewed by me: sinus bradycardia       CXR as reviewed by the radiologist: Clear lungs

## 2023-09-06 NOTE — ED PROVIDER NOTE - CARDIAC, MLM
Normal rate, regular rhythm.  Heart sounds S1, S2.  No murmurs, rubs or gallops. No pedal/pitting edema or tenderness of calf

## 2023-09-06 NOTE — PATIENT PROFILE ADULT - FUNCTIONAL ASSESSMENT - DAILY ACTIVITY 4.
Received EKG from [facility] dated 9/22/22 showing patient in A fib. VO per Dr. Marcelo Piedra recommend starting Eliquis 5 mg BID after cath. Attempted to reach patient by telephone. VM currently full. Will try again later. Received a fax from [facility] requesting last device check. Faxed last device check as well as Dr. Aimee Seals recommendations to start [address] to [facility] at fax number [phone]. Fax confirmation received. 4 = No assist / stand by assistance

## 2023-09-07 LAB
ALBUMIN SERPL ELPH-MCNC: 4.2 G/DL — SIGNIFICANT CHANGE UP (ref 3.3–5)
ALP SERPL-CCNC: 45 U/L — SIGNIFICANT CHANGE UP (ref 40–120)
ALT FLD-CCNC: 15 U/L — SIGNIFICANT CHANGE UP (ref 4–41)
ANION GAP SERPL CALC-SCNC: 12 MMOL/L — SIGNIFICANT CHANGE UP (ref 7–14)
AST SERPL-CCNC: 19 U/L — SIGNIFICANT CHANGE UP (ref 4–40)
BASOPHILS # BLD AUTO: 0.01 K/UL — SIGNIFICANT CHANGE UP (ref 0–0.2)
BASOPHILS NFR BLD AUTO: 0.1 % — SIGNIFICANT CHANGE UP (ref 0–2)
BILIRUB SERPL-MCNC: 0.4 MG/DL — SIGNIFICANT CHANGE UP (ref 0.2–1.2)
BUN SERPL-MCNC: 16 MG/DL — SIGNIFICANT CHANGE UP (ref 7–23)
CALCIUM SERPL-MCNC: 9.4 MG/DL — SIGNIFICANT CHANGE UP (ref 8.4–10.5)
CHLORIDE SERPL-SCNC: 104 MMOL/L — SIGNIFICANT CHANGE UP (ref 98–107)
CHOLEST SERPL-MCNC: 125 MG/DL — SIGNIFICANT CHANGE UP
CO2 SERPL-SCNC: 21 MMOL/L — LOW (ref 22–31)
CREAT SERPL-MCNC: 1.03 MG/DL — SIGNIFICANT CHANGE UP (ref 0.5–1.3)
EGFR: 75 ML/MIN/1.73M2 — SIGNIFICANT CHANGE UP
EOSINOPHIL # BLD AUTO: 0.15 K/UL — SIGNIFICANT CHANGE UP (ref 0–0.5)
EOSINOPHIL NFR BLD AUTO: 2.2 % — SIGNIFICANT CHANGE UP (ref 0–6)
GLUCOSE BLDC GLUCOMTR-MCNC: 91 MG/DL — SIGNIFICANT CHANGE UP (ref 70–99)
GLUCOSE SERPL-MCNC: 104 MG/DL — HIGH (ref 70–99)
HCT VFR BLD CALC: 38.1 % — LOW (ref 39–50)
HDLC SERPL-MCNC: 45 MG/DL — SIGNIFICANT CHANGE UP
HGB BLD-MCNC: 12.8 G/DL — LOW (ref 13–17)
IANC: 4.19 K/UL — SIGNIFICANT CHANGE UP (ref 1.8–7.4)
IMM GRANULOCYTES NFR BLD AUTO: 0.1 % — SIGNIFICANT CHANGE UP (ref 0–0.9)
LIPID PNL WITH DIRECT LDL SERPL: 64 MG/DL — SIGNIFICANT CHANGE UP
LYMPHOCYTES # BLD AUTO: 1.76 K/UL — SIGNIFICANT CHANGE UP (ref 1–3.3)
LYMPHOCYTES # BLD AUTO: 25.9 % — SIGNIFICANT CHANGE UP (ref 13–44)
MCHC RBC-ENTMCNC: 27.2 PG — SIGNIFICANT CHANGE UP (ref 27–34)
MCHC RBC-ENTMCNC: 33.6 GM/DL — SIGNIFICANT CHANGE UP (ref 32–36)
MCV RBC AUTO: 80.9 FL — SIGNIFICANT CHANGE UP (ref 80–100)
MONOCYTES # BLD AUTO: 0.67 K/UL — SIGNIFICANT CHANGE UP (ref 0–0.9)
MONOCYTES NFR BLD AUTO: 9.9 % — SIGNIFICANT CHANGE UP (ref 2–14)
NEUTROPHILS # BLD AUTO: 4.19 K/UL — SIGNIFICANT CHANGE UP (ref 1.8–7.4)
NEUTROPHILS NFR BLD AUTO: 61.8 % — SIGNIFICANT CHANGE UP (ref 43–77)
NON HDL CHOLESTEROL: 80 MG/DL — SIGNIFICANT CHANGE UP
NRBC # BLD: 0 /100 WBCS — SIGNIFICANT CHANGE UP (ref 0–0)
NRBC # FLD: 0 K/UL — SIGNIFICANT CHANGE UP (ref 0–0)
PLATELET # BLD AUTO: 187 K/UL — SIGNIFICANT CHANGE UP (ref 150–400)
POTASSIUM SERPL-MCNC: 4.1 MMOL/L — SIGNIFICANT CHANGE UP (ref 3.5–5.3)
POTASSIUM SERPL-SCNC: 4.1 MMOL/L — SIGNIFICANT CHANGE UP (ref 3.5–5.3)
PROT SERPL-MCNC: 7.1 G/DL — SIGNIFICANT CHANGE UP (ref 6–8.3)
RBC # BLD: 4.71 M/UL — SIGNIFICANT CHANGE UP (ref 4.2–5.8)
RBC # FLD: 14.1 % — SIGNIFICANT CHANGE UP (ref 10.3–14.5)
SODIUM SERPL-SCNC: 137 MMOL/L — SIGNIFICANT CHANGE UP (ref 135–145)
T3 SERPL-MCNC: 93 NG/DL — SIGNIFICANT CHANGE UP (ref 80–200)
T4 AB SER-ACNC: 4.91 UG/DL — LOW (ref 5.1–13)
T4 FREE SERPL-MCNC: 0.8 NG/DL — LOW (ref 0.9–1.8)
TRIGL SERPL-MCNC: 79 MG/DL — SIGNIFICANT CHANGE UP
TSH SERPL-MCNC: 12.33 UIU/ML — HIGH (ref 0.27–4.2)
WBC # BLD: 6.79 K/UL — SIGNIFICANT CHANGE UP (ref 3.8–10.5)
WBC # FLD AUTO: 6.79 K/UL — SIGNIFICANT CHANGE UP (ref 3.8–10.5)

## 2023-09-07 PROCEDURE — 93018 CV STRESS TEST I&R ONLY: CPT | Mod: GC

## 2023-09-07 PROCEDURE — 78452 HT MUSCLE IMAGE SPECT MULT: CPT | Mod: 26

## 2023-09-07 PROCEDURE — 99222 1ST HOSP IP/OBS MODERATE 55: CPT | Mod: GC

## 2023-09-07 PROCEDURE — 93016 CV STRESS TEST SUPVJ ONLY: CPT | Mod: GC

## 2023-09-07 PROCEDURE — 93306 TTE W/DOPPLER COMPLETE: CPT | Mod: 26

## 2023-09-07 RX ORDER — POLYETHYLENE GLYCOL 3350 17 G/17G
17 POWDER, FOR SOLUTION ORAL DAILY
Refills: 0 | Status: DISCONTINUED | OUTPATIENT
Start: 2023-09-07 | End: 2023-09-08

## 2023-09-07 RX ORDER — SOD SULF/SODIUM/NAHCO3/KCL/PEG
4000 SOLUTION, RECONSTITUTED, ORAL ORAL ONCE
Refills: 0 | Status: COMPLETED | OUTPATIENT
Start: 2023-09-07 | End: 2023-09-07

## 2023-09-07 RX ORDER — INFLUENZA VIRUS VACCINE 15; 15; 15; 15 UG/.5ML; UG/.5ML; UG/.5ML; UG/.5ML
0.7 SUSPENSION INTRAMUSCULAR ONCE
Refills: 0 | Status: DISCONTINUED | OUTPATIENT
Start: 2023-09-07 | End: 2023-09-08

## 2023-09-07 RX ADMIN — ATORVASTATIN CALCIUM 80 MILLIGRAM(S): 80 TABLET, FILM COATED ORAL at 21:25

## 2023-09-07 RX ADMIN — Medication 4000 MILLILITER(S): at 19:37

## 2023-09-07 RX ADMIN — LOSARTAN POTASSIUM 50 MILLIGRAM(S): 100 TABLET, FILM COATED ORAL at 07:04

## 2023-09-07 RX ADMIN — Medication 88 MICROGRAM(S): at 07:04

## 2023-09-07 NOTE — CONSULT NOTE ADULT - ATTENDING COMMENTS
Patient seen/examined. Family at bedside. Assessment/recommendations as noted. Requested to evaluate regarding painless hematochezia-consistent lower GI bleed. Possibly outlet bleeding from hemorrhoids but further assessment plan to assess for diverticulosis or any possibility of neoplastic process. Colonoscopy planned for 9/8. Monitor serial hemoglobin/hematocrit.

## 2023-09-07 NOTE — CONSULT NOTE ADULT - ASSESSMENT
77 yo F with CAD s/p stent, HTN, and HLD presents to the ED with chest pain. Patient reports that he has been having R sided chest pain for the last couple of months. It is intermittent, nonradiating, and nonreproducible on palpation. Had cardiac work up with TTE and stress test which were negative. Patient also with hematochezia in the last 24 hrs for which GI was called. Although patient reporting only 1 episode of hematochezia today with straining. per chart review, it seems patient has having diarrhea with hematochezia although denied it at baseline. reports last Colonoscopy/EGD was 5 years ago, reported to be normal. Hb stable.     GI bLEED GI consutled   EGD today     CAD ACS ruled out   Cards following         HLS   HTN   Statins, Metoprolol

## 2023-09-07 NOTE — PROGRESS NOTE ADULT - NS ATTEND AMEND GEN_ALL_CORE FT
chest pain improving spontaneosuly. echo and stress test results reassuring.  overnight, had bright red bloody BM.  awaiting GI evaluation.  he is cleared for endoscopy based on testing results, ability to lie flat / no *angina*.  family wants to know if he can go home... but also doesn't want to take him home until GI eval.

## 2023-09-07 NOTE — CONSULT NOTE ADULT - SUBJECTIVE AND OBJECTIVE BOX
HPI:  79 yo F with CAD s/p stent, HTN, and HLD presents to the ED with chest pain. Patient reports that he has been having R sided chest pain for the last couple of months. It is intermittent, nonradiating, and nonreproducible on palpation. Had cardiac work up with TTE and stress test which were negative. Patient also with hematochezia in the last 24 hrs for which GI was called. Although patient reporting only 1 episode of hematochezia today with straining. per chart review, it seems patient has having diarrhea with hematochezia although denied it at baseline. reports last Colonoscopy/EGD was 5 years ago, reported to be normal. Hb stable.     Allergies:  PC Pen VK (Hives)        Hospital Medications:  acetaminophen     Tablet .. 650 milliGRAM(s) Oral every 6 hours PRN  aluminum hydroxide/magnesium hydroxide/simethicone Suspension 30 milliLiter(s) Oral every 4 hours PRN  atorvastatin 80 milliGRAM(s) Oral at bedtime  influenza  Vaccine (HIGH DOSE) 0.7 milliLiter(s) IntraMuscular once  levothyroxine 88 MICROGram(s) Oral daily  losartan 50 milliGRAM(s) Oral daily  melatonin 3 milliGRAM(s) Oral at bedtime PRN  metoprolol succinate ER 25 milliGRAM(s) Oral daily  ondansetron Injectable 4 milliGRAM(s) IV Push every 8 hours PRN      PMHX/PSHX:  CAD (coronary artery disease)    HTN (hypertension)    DM (diabetes mellitus)    Stented coronary artery    No significant past surgical history        Family history:  No pertinent family history in first degree relatives        Social History: no smoking    ROS:   General:  No fevers, chills or night sweats  ENT:  No sore throat or dysphagia  CV:  No pain or palpitations  Resp:  No dyspnea, cough or  wheezing  GI:  as above  Skin:  No rash or edema  Neuro: no weakness   Hematologic: no bleeding  Musculoskeletal: no muscle pain or join pain  Psych: no agitation     : no dysuria      PHYSICAL EXAM:   GENERAL:  NAD, Appears stated age  HEENT:  NC/AT,  conjunctivae clear and pink, sclera -anicteric  CHEST:  CTA B/L, Normal effort  HEART:  RRR S1/S2,  ABDOMEN:  Soft, non-tender, non-distended,  no masses, TARA with red blood    EXTREMITIES:  No cyanosis or Edema  SKIN:  Warm & Dry. No rash or erythema  NEURO:  Alert, oriented, no focal deficit    Vital Signs:  Vital Signs Last 24 Hrs  T(C): 36.4 (07 Sep 2023 14:46), Max: 36.4 (07 Sep 2023 06:29)  T(F): 97.6 (07 Sep 2023 14:46), Max: 97.6 (07 Sep 2023 14:46)  HR: 58 (07 Sep 2023 14:46) (54 - 67)  BP: 169/85 (07 Sep 2023 14:46) (142/67 - 169/85)  BP(mean): --  RR: 18 (07 Sep 2023 14:46) (16 - 18)  SpO2: 100% (07 Sep 2023 14:46) (98% - 100%)    Parameters below as of 07 Sep 2023 14:46  Patient On (Oxygen Delivery Method): room air      Daily     Daily     LABS:                        12.8   6.79  )-----------( 187      ( 07 Sep 2023 05:30 )             38.1     Mean Cell Volume: 80.9 fL (09-07-23 @ 05:30)    09-07    137  |  104  |  16  ----------------------------<  104<H>  4.1   |  21<L>  |  1.03    Ca    9.4      07 Sep 2023 05:30    TPro  7.1  /  Alb  4.2  /  TBili  0.4  /  DBili  x   /  AST  19  /  ALT  15  /  AlkPhos  45  09-07    LIVER FUNCTIONS - ( 07 Sep 2023 05:30 )  Alb: 4.2 g/dL / Pro: 7.1 g/dL / ALK PHOS: 45 U/L / ALT: 15 U/L / AST: 19 U/L / GGT: x             Urinalysis Basic - ( 07 Sep 2023 05:30 )    Color: x / Appearance: x / SG: x / pH: x  Gluc: 104 mg/dL / Ketone: x  / Bili: x / Urobili: x   Blood: x / Protein: x / Nitrite: x   Leuk Esterase: x / RBC: x / WBC x   Sq Epi: x / Non Sq Epi: x / Bacteria: x                              12.8   6.79  )-----------( 187      ( 07 Sep 2023 05:30 )             38.1                         11.8   7.39  )-----------( 181      ( 06 Sep 2023 08:34 )             34.8     Imaging:

## 2023-09-07 NOTE — PROGRESS NOTE ADULT - SUBJECTIVE AND OBJECTIVE BOX
Date of service 9/7/23    chief complaint: chest pain    extended hpi: 78-year-old male with past medical history of coronary artery disease s/p PCI, and hypertension presenting with chest pain associated with shortness of breath that has been present for over a year however worsened over the past 2 days    reports BRBPR today, no chest pain or SOB    Review of Systems:   Constitutional: [ ] fevers, [ ] chills.   Skin: [ ] dry skin. [ ] rashes.  Psychiatric: [ ] depression, [ ] anxiety.   Gastrointestinal: [ ] BRBPR, [ ] melena.   Neurological: [ ] confusion. [ ] seizures. [ ] shuffling gait.   Ears,Nose,Mouth and Throat: [ ] ear pain [ ] sore throat.   Eyes: [ ] diplopia.   Respiratory: [ ] hemoptysis. [ ] shortness of breath  Cardiovascular: See HPI above  Hematologic/Lymphatic: [ ] anemia. [ ] painful nodes. [ ] prolonged bleeding.   Genitourinary: [ ] hematuria. [ ] flank pain.   Endocrine: [ ] significant change in weight. [ ] intolerance to heat and cold.     Review of systems [ x] otherwise negative, [ ] otherwise unable to obtain    FH: no family history of sudden cardiac death in first degree relatives    SH: [ ] tobacco, [ ] alcohol, [ ] drugs    acetaminophen     Tablet .. 650 milliGRAM(s) Oral every 6 hours PRN  aluminum hydroxide/magnesium hydroxide/simethicone Suspension 30 milliLiter(s) Oral every 4 hours PRN  atorvastatin 80 milliGRAM(s) Oral at bedtime  influenza  Vaccine (HIGH DOSE) 0.7 milliLiter(s) IntraMuscular once  levothyroxine 88 MICROGram(s) Oral daily  losartan 50 milliGRAM(s) Oral daily  melatonin 3 milliGRAM(s) Oral at bedtime PRN  metoprolol succinate ER 25 milliGRAM(s) Oral daily  ondansetron Injectable 4 milliGRAM(s) IV Push every 8 hours PRN                            12.8   6.79  )-----------( 187      ( 07 Sep 2023 05:30 )             38.1       137  |  104  |  16  ----------------------------<  104<H>  4.1   |  21<L>  |  1.03    Ca    9.4      07 Sep 2023 05:30    TPro  7.1  /  Alb  4.2  /  TBili  0.4  /  DBili  x   /  AST  19  /  ALT  15  /  AlkPhos  45  09-07      T(C): 36.4 (09-07-23 @ 14:46), Max: 36.6 (09-06-23 @ 16:07)  HR: 58 (09-07-23 @ 14:46) (51 - 67)  BP: 169/85 (09-07-23 @ 14:46) (142/67 - 175/78)  RR: 18 (09-07-23 @ 14:46) (15 - 18)  SpO2: 100% (09-07-23 @ 14:46) (98% - 100%)    General: Well nourished in no acute distress. Alert and Oriented * 3.   Head: Normocephalic and atraumatic.   Neck: No JVD. No bruits. Supple. Does not appear to be enlarged.   Cardiovascular: + S1,S2 ; RRR Soft systolic murmur at the left lower sternal border. No rubs noted.    Lungs: CTA b/l. No rhonchi, rales or wheezes.   Abdomen: + BS, soft. Non tender. Non distended. No rebound. No guarding.   Extremities: No clubbing/cyanosis/edema.   Neurologic: Moves all four extremities. Full range of motion.   Skin: Warm and moist. The patient's skin has normal elasticity and good skin turgor.   Psychiatric: Appropriate mood and affect.  Musculoskeletal: Normal range of motion, normal strength    DATA      ECG:  NSR	    < from: Xray Chest 2 Views PA/Lat (09.06.23 @ 09:02) >  IMPRESSION: Clear lungs with normal heart size and no evidence of CHF.    < end of copied text >    < from: Transthoracic Echocardiogram (09.07.23 @ 07:30) >  CONCLUSIONS:  1. Mitral annular calcification, otherwise normal mitral  valve. Mild mitral regurgitation.  2. Calcified trileaflet aortic valve with normal opening.  Mild-moderate aortic regurgitation.  3. Mildly dilated left atrium.  LA volume index = 38 cc/m2.  4. Normal left ventricular internal dimensions and wall  thicknesses.  5. Normal left ventricular systolic function. No segmental  wall motion abnormalities.  6. Normal right ventricular size and function.  ------------------------------------------------------------------------  Confirmed on  9/7/2023 - 09:02:54 by Buzz Sandoval M.D.,  [facility], [facility]  -----------------    < end of copied text >    < from: Nuclear Stress Test-Pharmacologic (Nuclear Stress Test-Pharmacologic .) (09.07.23 @ 10:55) >  GATED ANALYSIS:  Post-stress gated wall motion analysis was performed (LVEF  = 63 %;LVEDV = 75 ml.), revealing normal LV function. The  RV function appeared normal.  ------------------------------------------------------------------------  IMPRESSIONS:Normal Study  * Myocardial Perfusion SPECT results are normal.  * Review of raw data shows: The study is of good technical  quality.  * The left ventricle was normal in size. Normal myocardial  perfusion scan,with no evidence of infarction or inducible  ischemia.  * Post-stress gated wall motion analysis was performed  (LVEF = 63 %;LVEDV = 75 ml.), revealing normal LV  function. The RV function appeared normal.  ------------------------------------------------------------------------  Confirmed on  9/7/2023 - 14:02:05 by Deni Figueroa M.D.    < end of copied text >        ASSESSMENT/PLAN: 	  78-year-old male with past medical history of coronary artery disease s/p PCI, and hypertension presenting with chest pain associated with shortness of breath that has been present for over a year however worsened over the past 2 days. Reports pain only with exertion the last 2 days.  No recent work up.  Has followed intermittently with [facility] clinic.  LAst cath here 2015 with RCA stent per report.    CHEST PAIN  --ACS ruled out  --not in clinical CHF  --TTE and NST with normal LV function and No ischemia or infarct    BRBPR today and at home  --GI eval  --change diet to clears  --monitor BMs  --optimized from CV perspective if endoscopic procedures planned

## 2023-09-07 NOTE — CONSULT NOTE ADULT - ASSESSMENT
77 yo F with CAD s/p stent, HTN, and HLD presents to the ED with chest pain. Patient reports that he has been having R sided chest pain for the last couple of months. It is intermittent, nonradiating, and nonreproducible on palpation. Had cardiac work up with TTE and stress test which were negative. Patient also with hematochezia in the last 24 hrs for which GI was called. Although patient reporting only 1 episode of hematochezia today with straining. per chart review, it seems patient has having diarrhea with hematochezia although denied it at baseline. reports last Colonoscopy/EGD was 5 years ago, reported to be normal. Hb stable.     #hematochezia  #constipation  -suspect outlet bleeding most likely from hemorrhoidal bleeding. Given last Colonoscopy 5 years ago would benefit from repeat exam. Patient prefers inpatient. Will need to clarify if he is okay with diagnostic only given patient is on Plavix as OP.    Recommendations:  -Continue clear liquid diet  -start Miralax Daily given constipation  -Trend hb  -If patient agreeable with diagnostic colonoscopy, can plan for Colonoscopy tomorrow acknowledging if polyp are detected they would not be removed.   -Can consider EGD if still having upper Gi sxs.    Recommendations preliminary until signed by attending.     Alex Kapadia MD  Gastroenterology/Hepatology Fellow  1st option: [phone] (text or call), ONLY available from 7:00 am to 5:00 pm.   **Contact on-call GI fellow via answering service ([phone]) from 5pm-7am AND on weekends/holidays**  2nd option: Available via [employer] Teams  3rd option: Pager: [phone]                77 yo F with CAD s/p stent, HTN, and HLD presents to the ED with chest pain. Patient reports that he has been having R sided chest pain for the last couple of months. It is intermittent, nonradiating, and nonreproducible on palpation. Had cardiac work up with TTE and stress test which were negative. Patient also with hematochezia in the last 24 hrs for which GI was called. Although patient reporting only 1 episode of hematochezia today with straining. per chart review, it seems patient has having diarrhea with hematochezia although denied it at baseline. reports last Colonoscopy/EGD was 5 years ago, reported to be normal. Hb stable.     #hematochezia  #constipation  -suspect outlet bleeding most likely from hemorrhoidal bleeding with straining. Alternative etiology includes diverticular given reporting also loose BM Given last Colonoscopy 5 years ago would benefit from repeat exam. Patient prefers inpatient. Will need to clarify if he is okay with diagnostic only given patient is on Plavix as OP.    Recommendations:  -Continue clear liquid diet  -start Miralax Daily given constipation  -Trend hb  -NPO after midnight  -Plan for Colonoscopy tomorrow   -Can consider EGD if still having upper Gi sxs.    Recommendations preliminary until signed by attending.     Alex Kapadia MD  Gastroenterology/Hepatology Fellow  1st option: [phone] (text or call), ONLY available from 7:00 am to 5:00 pm.   **Contact on-call GI fellow via answering service ([phone]) from 5pm-7am AND on weekends/holidays**  2nd option: Available via [employer] Teams  3rd option: Pager: [phone]

## 2023-09-07 NOTE — CONSULT NOTE ADULT - SUBJECTIVE AND OBJECTIVE BOX
HPI:  79 yo F with CAD s/p stent, HTN, and HLD presents to the ED with chest pain and hematochezia. Patient reports that he has been having R sided chest pain for the last couple of months. It is intermittent, nonradiating, and nonreproducible on palpation. It is associated with exertion. He reports to [facility] as well. Nothing relieves the pain. He also started having diarrhea today with hematochezia. The blood is not mixed with stool. He has abdominal discomfort with BM. He reports to have fatigue but denies any fever, chills, nausea, vomiting, or LE edema.   In the ED, her vitals were notable for bradycardia and HTN. Labs were notable for anemia. EKG showed sinus bradycardia.  (06 Sep 2023 21:27)      PAST MEDICAL & SURGICAL HISTORY:  CAD (coronary artery disease)      HTN (hypertension)      DM (diabetes mellitus)      Stented coronary artery      No significant past surgical history          Review of Systems:   CONSTITUTIONAL: No fever, weight loss, or fatigue  EYES: No eye pain, visual disturbances, or discharge  ENMT:  No difficulty hearing, tinnitus, vertigo; No sinus or throat pain  NECK: No pain or stiffness  BREASTS: No pain, masses, or nipple discharge  RESPIRATORY: No cough, wheezing, chills or hemoptysis; No shortness of breath  CARDIOVASCULAR: No chest pain, palpitations, dizziness, or leg swelling  GASTROINTESTINAL: No abdominal or epigastric pain. No nausea, vomiting, or hematemesis; No diarrhea or constipation. No melena or hematochezia.  GENITOURINARY: No dysuria, frequency, hematuria, or incontinence  NEUROLOGICAL: No headaches, memory loss, loss of strength, numbness, or tremors  SKIN: No itching, burning, rashes, or lesions   LYMPH NODES: No enlarged glands  ENDOCRINE: No heat or cold intolerance; No hair loss  MUSCULOSKELETAL: No joint pain or swelling; No muscle, back, or extremity pain  PSYCHIATRIC: No depression, anxiety, mood swings, or difficulty sleeping  HEME/LYMPH: No easy bruising, or bleeding gums  ALLERY AND IMMUNOLOGIC: No hives or eczema    Allergies    PC Pen VK (Hives)    Intolerances        Social History:     FAMILY HISTORY:  No pertinent family history in first degree relatives        MEDICATIONS  (STANDING):  atorvastatin 80 milliGRAM(s) Oral at bedtime  influenza  Vaccine (HIGH DOSE) 0.7 milliLiter(s) IntraMuscular once  levothyroxine 88 MICROGram(s) Oral daily  losartan 50 milliGRAM(s) Oral daily  metoprolol succinate ER 25 milliGRAM(s) Oral daily  polyethylene glycol 3350 17 Gram(s) Oral daily    MEDICATIONS  (PRN):  acetaminophen     Tablet .. 650 milliGRAM(s) Oral every 6 hours PRN Temp greater or equal to 38C (100.4F), Mild Pain (1 - 3)  aluminum hydroxide/magnesium hydroxide/simethicone Suspension 30 milliLiter(s) Oral every 4 hours PRN Dyspepsia  melatonin 3 milliGRAM(s) Oral at bedtime PRN Insomnia  ondansetron Injectable 4 milliGRAM(s) IV Push every 8 hours PRN Nausea and/or Vomiting        CAPILLARY BLOOD GLUCOSE        I&O's Summary      PHYSICAL EXAM:  GENERAL: NAD, well-developed  HEAD:  Atraumatic, Normocephalic  EYES: EOMI, PERRLA, conjunctiva and sclera clear  NECK: Supple, No JVD  CHEST/LUNG: Clear to auscultation bilaterally; No wheeze  HEART: Regular rate and rhythm; No murmurs, rubs, or gallops  ABDOMEN: Soft, Nontender, Nondistended; Bowel sounds present  EXTREMITIES:  2+ Peripheral Pulses, No clubbing, cyanosis, or edema  PSYCH: AAOx3  NEUROLOGY: non-focal  SKIN: No rashes or lesions    LABS:                        12.9   7.59  )-----------( 191      ( 08 Sep 2023 06:29 )             38.3     09-08    138  |  101  |  12  ----------------------------<  111<H>  4.3   |  26  |  1.02    Ca    9.5      08 Sep 2023 06:29  Phos  3.1     09-08  Mg     2.00     09-08    TPro  7.1  /  Alb  4.2  /  TBili  0.4  /  DBili  x   /  AST  19  /  ALT  15  /  AlkPhos  45  09-07          Urinalysis Basic - ( 08 Sep 2023 06:29 )    Color: x / Appearance: x / SG: x / pH: x  Gluc: 111 mg/dL / Ketone: x  / Bili: x / Urobili: x   Blood: x / Protein: x / Nitrite: x   Leuk Esterase: x / RBC: x / WBC x   Sq Epi: x / Non Sq Epi: x / Bacteria: x        RADIOLOGY & ADDITIONAL TESTS:    Imaging Personally Reviewed:    Consultant(s) Notes Reviewed:      Care Discussed with Consultants/Other Providers:

## 2023-09-08 ENCOUNTER — TRANSCRIPTION ENCOUNTER (OUTPATIENT)
Age: 78
End: 2023-09-08

## 2023-09-08 VITALS
RESPIRATION RATE: 18 BRPM | SYSTOLIC BLOOD PRESSURE: 166 MMHG | HEART RATE: 67 BPM | OXYGEN SATURATION: 100 % | DIASTOLIC BLOOD PRESSURE: 92 MMHG | TEMPERATURE: 98 F

## 2023-09-08 LAB
A1C WITH ESTIMATED AVERAGE GLUCOSE RESULT: 6.1 % — HIGH (ref 4–5.6)
ANION GAP SERPL CALC-SCNC: 11 MMOL/L — SIGNIFICANT CHANGE UP (ref 7–14)
BUN SERPL-MCNC: 12 MG/DL — SIGNIFICANT CHANGE UP (ref 7–23)
CALCIUM SERPL-MCNC: 9.5 MG/DL — SIGNIFICANT CHANGE UP (ref 8.4–10.5)
CHLORIDE SERPL-SCNC: 101 MMOL/L — SIGNIFICANT CHANGE UP (ref 98–107)
CO2 SERPL-SCNC: 26 MMOL/L — SIGNIFICANT CHANGE UP (ref 22–31)
CREAT SERPL-MCNC: 1.02 MG/DL — SIGNIFICANT CHANGE UP (ref 0.5–1.3)
EGFR: 76 ML/MIN/1.73M2 — SIGNIFICANT CHANGE UP
ESTIMATED AVERAGE GLUCOSE: 128 — SIGNIFICANT CHANGE UP
GLUCOSE BLDC GLUCOMTR-MCNC: 94 MG/DL — SIGNIFICANT CHANGE UP (ref 70–99)
GLUCOSE BLDC GLUCOMTR-MCNC: 98 MG/DL — SIGNIFICANT CHANGE UP (ref 70–99)
GLUCOSE SERPL-MCNC: 111 MG/DL — HIGH (ref 70–99)
HCT VFR BLD CALC: 38.3 % — LOW (ref 39–50)
HGB BLD-MCNC: 12.9 G/DL — LOW (ref 13–17)
MAGNESIUM SERPL-MCNC: 2 MG/DL — SIGNIFICANT CHANGE UP (ref 1.6–2.6)
MCHC RBC-ENTMCNC: 27.6 PG — SIGNIFICANT CHANGE UP (ref 27–34)
MCHC RBC-ENTMCNC: 33.7 GM/DL — SIGNIFICANT CHANGE UP (ref 32–36)
MCV RBC AUTO: 81.8 FL — SIGNIFICANT CHANGE UP (ref 80–100)
NRBC # BLD: 0 /100 WBCS — SIGNIFICANT CHANGE UP (ref 0–0)
NRBC # FLD: 0 K/UL — SIGNIFICANT CHANGE UP (ref 0–0)
PHOSPHATE SERPL-MCNC: 3.1 MG/DL — SIGNIFICANT CHANGE UP (ref 2.5–4.5)
PLATELET # BLD AUTO: 191 K/UL — SIGNIFICANT CHANGE UP (ref 150–400)
POTASSIUM SERPL-MCNC: 4.3 MMOL/L — SIGNIFICANT CHANGE UP (ref 3.5–5.3)
POTASSIUM SERPL-SCNC: 4.3 MMOL/L — SIGNIFICANT CHANGE UP (ref 3.5–5.3)
RBC # BLD: 4.68 M/UL — SIGNIFICANT CHANGE UP (ref 4.2–5.8)
RBC # FLD: 14.6 % — HIGH (ref 10.3–14.5)
SODIUM SERPL-SCNC: 138 MMOL/L — SIGNIFICANT CHANGE UP (ref 135–145)
WBC # BLD: 7.59 K/UL — SIGNIFICANT CHANGE UP (ref 3.8–10.5)
WBC # FLD AUTO: 7.59 K/UL — SIGNIFICANT CHANGE UP (ref 3.8–10.5)

## 2023-09-08 PROCEDURE — 45380 COLONOSCOPY AND BIOPSY: CPT | Mod: GC

## 2023-09-08 PROCEDURE — 88305 TISSUE EXAM BY PATHOLOGIST: CPT | Mod: 26

## 2023-09-08 RX ORDER — ASPIRIN/CALCIUM CARB/MAGNESIUM 324 MG
1 TABLET ORAL
Qty: 30 | Refills: 0
Start: 2023-09-08 | End: 2023-10-07

## 2023-09-08 RX ORDER — CLOPIDOGREL BISULFATE 75 MG/1
1 TABLET, FILM COATED ORAL
Refills: 0 | DISCHARGE

## 2023-09-08 RX ORDER — ASPIRIN/CALCIUM CARB/MAGNESIUM 324 MG
81 TABLET ORAL DAILY
Refills: 0 | Status: DISCONTINUED | OUTPATIENT
Start: 2023-09-09 | End: 2023-09-08

## 2023-09-08 RX ADMIN — LOSARTAN POTASSIUM 50 MILLIGRAM(S): 100 TABLET, FILM COATED ORAL at 05:56

## 2023-09-08 RX ADMIN — Medication 25 MILLIGRAM(S): at 05:56

## 2023-09-08 RX ADMIN — Medication 88 MICROGRAM(S): at 05:56

## 2023-09-08 NOTE — PROGRESS NOTE ADULT - SUBJECTIVE AND OBJECTIVE BOX
Date of service 9/8/23    chief complaint: chest pain    extended hpi: 78-year-old male with past medical history of coronary artery disease s/p PCI, and hypertension presenting with chest pain associated with shortness of breath that has been present for over a year however worsened over the past 2 days    reports BRBPR today, no chest pain or SOB    Review of Systems:   Constitutional: [ ] fevers, [ ] chills.   Skin: [ ] dry skin. [ ] rashes.  Psychiatric: [ ] depression, [ ] anxiety.   Gastrointestinal: [ ] BRBPR, [ ] melena.   Neurological: [ ] confusion. [ ] seizures. [ ] shuffling gait.   Ears,Nose,Mouth and Throat: [ ] ear pain [ ] sore throat.   Eyes: [ ] diplopia.   Respiratory: [ ] hemoptysis. [ ] shortness of breath  Cardiovascular: See HPI above  Hematologic/Lymphatic: [ ] anemia. [ ] painful nodes. [ ] prolonged bleeding.   Genitourinary: [ ] hematuria. [ ] flank pain.   Endocrine: [ ] significant change in weight. [ ] intolerance to heat and cold.     Review of systems [ x] otherwise negative, [ ] otherwise unable to obtain    FH: no family history of sudden cardiac death in first degree relatives    SH: [ ] tobacco, [ ] alcohol, [ ] drugs    acetaminophen     Tablet .. 650 milliGRAM(s) Oral every 6 hours PRN  aluminum hydroxide/magnesium hydroxide/simethicone Suspension 30 milliLiter(s) Oral every 4 hours PRN  atorvastatin 80 milliGRAM(s) Oral at bedtime  influenza  Vaccine (HIGH DOSE) 0.7 milliLiter(s) IntraMuscular once  levothyroxine 88 MICROGram(s) Oral daily  losartan 50 milliGRAM(s) Oral daily  melatonin 3 milliGRAM(s) Oral at bedtime PRN  metoprolol succinate ER 25 milliGRAM(s) Oral daily  ondansetron Injectable 4 milliGRAM(s) IV Push every 8 hours PRN  polyethylene glycol 3350 17 Gram(s) Oral daily                            12.9   7.59  )-----------( 191      ( 08 Sep 2023 06:29 )             38.3       09-08    138  |  101  |  12  ----------------------------<  111<H>  4.3   |  26  |  1.02    Ca    9.5      08 Sep 2023 06:29  Phos  3.1     09-08  Mg     2.00     09-08    TPro  7.1  /  Alb  4.2  /  TBili  0.4  /  DBili  x   /  AST  19  /  ALT  15  /  AlkPhos  45  09-07    T(C): 36.8 (09-08-23 @ 12:50), Max: 36.8 (09-07-23 @ 22:09)  HR: 67 (09-08-23 @ 12:50) (49 - 69)  BP: 166/92 (09-08-23 @ 12:50) (103/85 - 166/92)  RR: 18 (09-08-23 @ 12:50) (12 - 18)  SpO2: 100% (09-08-23 @ 12:50) (98% - 100%)  Wt(kg): --    I&O's Summary    General: Well nourished in no acute distress. Alert and Oriented * 3.   Head: Normocephalic and atraumatic.   Neck: No JVD. No bruits. Supple. Does not appear to be enlarged.   Cardiovascular: + S1,S2 ; RRR Soft systolic murmur at the left lower sternal border. No rubs noted.    Lungs: CTA b/l. No rhonchi, rales or wheezes.   Abdomen: + BS, soft. Non tender. Non distended. No rebound. No guarding.   Extremities: No clubbing/cyanosis/edema.   Neurologic: Moves all four extremities. Full range of motion.   Skin: Warm and moist. The patient's skin has normal elasticity and good skin turgor.   Psychiatric: Appropriate mood and affect.  Musculoskeletal: Normal range of motion, normal strength    DATA      ECG:  NSR	    < from: Xray Chest 2 Views PA/Lat (09.06.23 @ 09:02) >  IMPRESSION: Clear lungs with normal heart size and no evidence of CHF.    < end of copied text >    < from: Transthoracic Echocardiogram (09.07.23 @ 07:30) >  CONCLUSIONS:  1. Mitral annular calcification, otherwise normal mitral  valve. Mild mitral regurgitation.  2. Calcified trileaflet aortic valve with normal opening.  Mild-moderate aortic regurgitation.  3. Mildly dilated left atrium.  LA volume index = 38 cc/m2.  4. Normal left ventricular internal dimensions and wall  thicknesses.  5. Normal left ventricular systolic function. No segmental  wall motion abnormalities.  6. Normal right ventricular size and function.  ------------------------------------------------------------------------  Confirmed on  9/7/2023 - 09:02:54 by Buzz Sandoval M.D.,  [facility]  -----------------    < end of copied text >    < from: Nuclear Stress Test-Pharmacologic (Nuclear Stress Test-Pharmacologic .) (09.07.23 @ 10:55) >  GATED ANALYSIS:  Post-stress gated wall motion analysis was performed (LVEF  = 63 %;LVEDV = 75 ml.), revealing normal LV function. The  RV function appeared normal.  ------------------------------------------------------------------------  IMPRESSIONS:Normal Study  * Myocardial Perfusion SPECT results are normal.  * Review of raw data shows: The study is of good technical  quality.  * The left ventricle was normal in size. Normal myocardial  perfusion scan,with no evidence of infarction or inducible  ischemia.  * Post-stress gated wall motion analysis was performed  (LVEF = 63 %;LVEDV = 75 ml.), revealing normal LV  function. The RV function appeared normal.  ------------------------------------------------------------------------  Confirmed on  9/7/2023 - 14:02:05 by Deni Figueroa M.D.    < end of copied text >        ASSESSMENT/PLAN: 	  78-year-old male with past medical history of coronary artery disease s/p PCI, and hypertension presenting with chest pain associated with shortness of breath that has been present for over a year however worsened over the past 2 days. Reports pain only with exertion the last 2 days.  No recent work up.  Has followed intermittently with [facility].  LAst cath here 2015 with RCA stent per report.    CHEST PAIN  --ACS ruled out  --not in clinical CHF  --TTE and NST with normal LV function and No ischemia or infarct    BRBPR  --colitis, internal hemorrhoid, and a 6mm polyp.  --outpatient followup for polypectomy.    Hx of PCI  -STOP Plavix.  Stent was 5 yrs ago.  -81mg aspirin daily starting tomorrow.    OK for DC home when feeling well.  Outpatient followup w/ GI.    Sy Godinez M.D.  Cardiac Electrophysiology  [phone]

## 2023-09-08 NOTE — DISCHARGE NOTE PROVIDER - NSDCMRMEDTOKEN_GEN_ALL_CORE_FT
clopidogrel 75 mg oral tablet: 1 tab(s) orally once a day  furosemide 20 mg oral tablet: 1 tab(s) orally 2 times a day  levothyroxine 88 mcg (0.088 mg) oral tablet: 1 tab(s) orally once a day  losartan 50 mg oral tablet: 1 tab(s) orally once a day  metoprolol succinate 25 mg oral tablet, extended release: 1 tab(s) orally once a day  rosuvastatin 40 mg oral tablet: 1 tab(s) orally once a day   aspirin 81 mg oral tablet, chewable: 1 tab(s) orally once a day  furosemide 20 mg oral tablet: 1 tab(s) orally 2 times a day  levothyroxine 88 mcg (0.088 mg) oral tablet: 1 tab(s) orally once a day  losartan 50 mg oral tablet: 1 tab(s) orally once a day  metoprolol succinate 25 mg oral tablet, extended release: 1 tab(s) orally once a day  rosuvastatin 40 mg oral tablet: 1 tab(s) orally once a day

## 2023-09-08 NOTE — DISCHARGE NOTE PROVIDER - NSFOLLOWUPCLINICS_GEN_ALL_ED_FT
[facility]  Gastroenterology  [address] [place] [address] [address]  [place] [address]  Phone: [phone]  Fax:

## 2023-09-08 NOTE — DISCHARGE NOTE PROVIDER - CARE PROVIDER_API CALL
Jimi Thorne.  Gastroenterology  [address] [place] [address], [address]  [place] [address]  Phone: [phone]  Fax: [phone]  Follow Up Time:

## 2023-09-08 NOTE — DISCHARGE NOTE PROVIDER - NSDCFUADDAPPT_GEN_ALL_CORE_FT
Follow up with your primary care physician for further monitoring in 1-2 weeks. Please call to arrange appointment.     Follow up with GI upon discharge for pathology results.

## 2023-09-08 NOTE — DISCHARGE NOTE PROVIDER - HOSPITAL COURSE
77 yo M with CAD s/p stent, HTN, and HLD presents to the ED with chest pain and hematochezia, admitted for further work up.      Chest pain, CAD  - TTE EF 67%, mild-mod AR, mild MR, no segmental WMA   - NST normal, no evidence of infarction or ischemia   - Telemetry   - Trop 19-15  - ACS and clinical CHF ruled out by cards    Hematochezia  - Patient reports to have diarrhea and hematochezia   - Hold antiplatelets   - 9/8 colonoscopy: internal hemorrhoids, one 6mm polyp not resected, erythematous colopathy secondary to colitis - biopsied   - Advanced diet to regular   - F/u GI outpt for pathology results  - Repeat colonoscopy in 6 months for polypectomy if within GOC    On 9/8/23 this case was reviewed with Dr. Michel. The patient is medically stable and optimized for discharge. All medications were reviewed and prescriptions were sent to mutually agreed upon pharmacy. 79 yo M with CAD s/p stent, HTN, and HLD presents to the ED with chest pain and hematochezia, admitted for further work up.      Chest pain, CAD  - TTE EF 67%, mild-mod AR, mild MR, no segmental WMA   - NST normal, no evidence of infarction or ischemia   - Telemetry   - Trop 19-15  - ACS and clinical CHF ruled out by cards  - For DC: Stop Plavix and Start ASA 81mg PO QD on 9/9 per Cards     Hematochezia  - Patient reports to have diarrhea and hematochezia   - Held antiplatelets; For DC: Stop Plavix and Start ASA 81mg PO QD on 9/9 per Cards   - 9/8 colonoscopy: internal hemorrhoids, one 6mm polyp not resected, erythematous colopathy secondary to colitis - biopsied   - Advanced diet to regular   - F/u GI outpt for pathology results  - Repeat colonoscopy in 6 months for polypectomy if within GOC    On 9/8/23 this case was reviewed with Dr. Michel. The patient is medically stable and optimized for discharge. All medications were reviewed and prescriptions were sent to mutually agreed upon pharmacy.

## 2023-09-08 NOTE — DISCHARGE NOTE PROVIDER - NSDCCPCAREPLAN_GEN_ALL_CORE_FT
PRINCIPAL DISCHARGE DIAGNOSIS  Diagnosis: Chest pain  Assessment and Plan of Treatment: Your cardiac workup was negative. You had an echocardiogram (ultrasound of the heaty) which showed preserved left ventricular ejection fraction of 67% and mild to moderate atrial regurgitation (backflow of blood through atrial heart valve) however no structurally significant abnormalities. You had a nuclear stress test which showed no evidence of infarction (blockage of heart vessel) or ischemia (poor blood flow to the heart). You were monitored on telemetry and followed by cardiology. Heart attack and heart failure was ruled out.      SECONDARY DISCHARGE DIAGNOSES  Diagnosis: Hematochezia  Assessment and Plan of Treatment: You had a colonoscopy on 9/8/23 showing internal hemorrhoids, 1 6mm polyp that was not resected, and reddened walls of the colon secondary to colitis (irritation / inflammation of colon walls) which was biopsied. You should follow up with GI upon discharge for pathology results. You should get a repeat colonoscopy in 6 months for polyp removal if within your goals of care.     PRINCIPAL DISCHARGE DIAGNOSIS  Diagnosis: Chest pain  Assessment and Plan of Treatment: Your cardiac workup was negative. You had an echocardiogram (ultrasound of the heaty) which showed preserved left ventricular ejection fraction of 67% and mild to moderate atrial regurgitation (backflow of blood through atrial heart valve) however no structurally significant abnormalities. You had a nuclear stress test which showed no evidence of infarction (blockage of heart vessel) or ischemia (poor blood flow to the heart). You were monitored on telemetry and followed by cardiology. Heart attack and heart failure was ruled out.      SECONDARY DISCHARGE DIAGNOSES  Diagnosis: Hematochezia  Assessment and Plan of Treatment: You had a colonoscopy on 9/8/23 showing internal hemorrhoids, 1 6mm polyp that was not resected, and reddened walls of the colon secondary to colitis (irritation / inflammation of colon walls) which was biopsied. You should follow up with GI upon discharge for pathology results. You should get a repeat colonoscopy in 6 months for polyp removal if within your goals of care.  As per cardiology, stop taking plavix and start taking aspirin 81mg by mouth once daily starting on 9/9/23 upon discharge.

## 2023-09-08 NOTE — DISCHARGE NOTE NURSING/CASE MANAGEMENT/SOCIAL WORK - PATIENT PORTAL LINK FT
You can access the FollowMyHealth Patient Portal offered by [facility] by registering at the following website: [email or web address]. By joining [email or web address]’s FollowMyHealth portal, you will also be able to view your health information using other applications (apps) compatible with our system.

## 2023-09-20 LAB — SURGICAL PATHOLOGY STUDY: SIGNIFICANT CHANGE UP

## 2023-09-29 ENCOUNTER — NON-APPOINTMENT (OUTPATIENT)
Age: 78
End: 2023-09-29

## 2023-09-29 PROBLEM — Z00.00 ENCOUNTER FOR PREVENTIVE HEALTH EXAMINATION: Status: ACTIVE | Noted: 2023-09-29

## 2024-01-03 ENCOUNTER — APPOINTMENT (OUTPATIENT)
Dept: GASTROENTEROLOGY | Facility: CLINIC | Age: 79
End: 2024-01-03

## 2025-06-25 NOTE — PATIENT PROFILE ADULT - HAVE YOU RECENTLY LOST WEIGHT WITHOUT TRYING?
The patient called he wanted to ask if there is a possibility that he has food allergies. He says he just gets so tired.    No (0)

## (undated) DEVICE — TUBING IV SET GRAVITY 3Y 100" MACRO

## (undated) DEVICE — CATH IV SAFE BC 22G X 1" (BLUE)

## (undated) DEVICE — DRSG CURITY GAUZE SPONGE 4 X 4" 12-PLY NON-STERILE

## (undated) DEVICE — LUBRICATING JELLY HR ONE SHOT 3G

## (undated) DEVICE — GOWN LG

## (undated) DEVICE — SALIVA EJECTOR (BLUE)

## (undated) DEVICE — TUBING MEDI-VAC W MAXIGRIP CONNECTORS 1/4"X6'

## (undated) DEVICE — ELCTR ECG CONDUCTIVE ADHESIVE

## (undated) DEVICE — DRSG BANDAID 0.75X3"

## (undated) DEVICE — PACK IV START WITH CHG

## (undated) DEVICE — BIOPSY FORCEP COLD DISP

## (undated) DEVICE — LINE BREATHE SAMPLNG

## (undated) DEVICE — TUBING SUCTION NONCONDUCTIVE 6MM X 12FT

## (undated) DEVICE — BIOPSY FORCEP RADIAL JAW 4 STANDARD WITH NEEDLE

## (undated) DEVICE — ELCTR GROUNDING PAD ADULT COVIDIEN

## (undated) DEVICE — BASIN EMESIS 10IN GRADUATED MAUVE

## (undated) DEVICE — CONTAINER FORMALIN 80ML YELLOW

## (undated) DEVICE — DRSG 2X2

## (undated) DEVICE — CONTAINER FORMALIN 10% 20ML